# Patient Record
Sex: FEMALE | Race: WHITE | NOT HISPANIC OR LATINO
[De-identification: names, ages, dates, MRNs, and addresses within clinical notes are randomized per-mention and may not be internally consistent; named-entity substitution may affect disease eponyms.]

---

## 2017-01-11 ENCOUNTER — RX ONLY (OUTPATIENT)
Age: 82
Setting detail: RX ONLY
End: 2017-01-11

## 2017-01-25 DIAGNOSIS — D47.3 ET (ESSENTIAL THROMBOCYTHEMIA) (HCC): ICD-10-CM

## 2017-01-25 PROBLEM — D49.2 NEOPLASM OF UNSPECIFIED BEHAVIOR OF BONE, SOFT TISSUE, AND SKIN: Status: RESOLVED | Noted: 2017-01-11 | Resolved: 2017-01-25

## 2017-01-25 RX ORDER — HYDROXYUREA 500 MG/1
CAPSULE ORAL
Qty: 180 CAP | Refills: 0 | Status: SHIPPED | OUTPATIENT
Start: 2017-01-25 | End: 2017-05-02 | Stop reason: SDUPTHER

## 2017-01-25 NOTE — TELEPHONE ENCOUNTER
Discussed with patient's daughter Moriah regarding monthly lab orders placed. Daughter stated patient was in the ER in [place] and had labs completed. They have not had labs completed this month just yet but is aware. Refill was sent for Hydrea.

## 2017-02-02 ENCOUNTER — HOSPITAL ENCOUNTER (OUTPATIENT)
Dept: LAB | Facility: MEDICAL CENTER | Age: 82
End: 2017-02-02
Attending: INTERNAL MEDICINE
Payer: MEDICARE

## 2017-02-02 LAB
ALBUMIN SERPL BCP-MCNC: 4.3 G/DL (ref 3.2–4.9)
ALBUMIN/GLOB SERPL: 1.8 G/DL
ALP SERPL-CCNC: 80 U/L (ref 30–99)
ALT SERPL-CCNC: 7 U/L (ref 2–50)
ANION GAP SERPL CALC-SCNC: 5 MMOL/L (ref 0–11.9)
ANISOCYTOSIS BLD QL SMEAR: ABNORMAL
AST SERPL-CCNC: 17 U/L (ref 12–45)
BASOPHILS # BLD AUTO: 0.3 K/UL (ref 0–0.12)
BASOPHILS NFR BLD AUTO: 6.2 % (ref 0–1.8)
BILIRUB SERPL-MCNC: 1 MG/DL (ref 0.1–1.5)
BUN SERPL-MCNC: 20 MG/DL (ref 8–22)
CALCIUM SERPL-MCNC: 9.4 MG/DL (ref 8.5–10.5)
CHLORIDE SERPL-SCNC: 100 MMOL/L (ref 96–112)
CO2 SERPL-SCNC: 29 MMOL/L (ref 20–33)
CREAT SERPL-MCNC: 0.78 MG/DL (ref 0.5–1.4)
EOSINOPHIL # BLD: 0.09 K/UL (ref 0–0.51)
EOSINOPHIL NFR BLD AUTO: 1.8 % (ref 0–6.9)
ERYTHROCYTE [DISTWIDTH] IN BLOOD BY AUTOMATED COUNT: 88 FL (ref 35.9–50)
GLOBULIN SER CALC-MCNC: 2.4 G/DL (ref 1.9–3.5)
GLUCOSE SERPL-MCNC: 81 MG/DL (ref 65–99)
HCT VFR BLD AUTO: 33.1 % (ref 37–47)
HGB BLD-MCNC: 10.8 G/DL (ref 12–16)
LG PLATELETS BLD QL SMEAR: NORMAL
LYMPHOCYTES # BLD: 0.68 K/UL (ref 1–4.8)
LYMPHOCYTES NFR BLD AUTO: 14.1 % (ref 22–41)
MACROCYTES BLD QL SMEAR: ABNORMAL
MANUAL DIFF BLD: NORMAL
MCH RBC QN AUTO: 37.9 PG (ref 27–33)
MCHC RBC AUTO-ENTMCNC: 32.6 G/DL (ref 33.6–35)
MCV RBC AUTO: 116.1 FL (ref 81.4–97.8)
MICROCYTES BLD QL SMEAR: ABNORMAL
MONOCYTES # BLD: 0.25 K/UL (ref 0–0.85)
MONOCYTES NFR BLD AUTO: 5.3 % (ref 0–13.4)
MORPHOLOGY BLD-IMP: NORMAL
NEUTROPHILS # BLD: 3.48 K/UL (ref 2–7.15)
NEUTROPHILS NFR BLD AUTO: 71.7 % (ref 44–72)
NEUTS BAND NFR BLD MANUAL: 0.9 % (ref 0–10)
NRBC # BLD AUTO: 0 K/UL
NRBC BLD-RTO: 0 /100 WBC
OVALOCYTES BLD QL SMEAR: NORMAL
PLATELET # BLD AUTO: 368 K/UL (ref 164–446)
PLATELET BLD QL SMEAR: NORMAL
PMV BLD AUTO: 11.3 FL (ref 9–12.9)
POIKILOCYTOSIS BLD QL SMEAR: NORMAL
POTASSIUM SERPL-SCNC: 4.7 MMOL/L (ref 3.6–5.5)
PROT SERPL-MCNC: 6.7 G/DL (ref 6–8.2)
RBC # BLD AUTO: 2.85 M/UL (ref 4.2–5.4)
RBC BLD AUTO: PRESENT
SODIUM SERPL-SCNC: 134 MMOL/L (ref 135–145)
TEAR DROP CELLS 1632: NORMAL
WBC # BLD AUTO: 4.8 K/UL (ref 4.8–10.8)

## 2017-02-02 PROCEDURE — 80053 COMPREHEN METABOLIC PANEL: CPT

## 2017-02-02 PROCEDURE — 85007 BL SMEAR W/DIFF WBC COUNT: CPT

## 2017-02-02 PROCEDURE — 36415 COLL VENOUS BLD VENIPUNCTURE: CPT

## 2017-02-02 PROCEDURE — 85027 COMPLETE CBC AUTOMATED: CPT

## 2017-03-06 DIAGNOSIS — D69.3 ESSENTIAL THROMBOCYTOPENIA (HCC): ICD-10-CM

## 2017-03-09 ENCOUNTER — HOSPITAL ENCOUNTER (OUTPATIENT)
Dept: LAB | Facility: MEDICAL CENTER | Age: 82
End: 2017-03-09
Attending: INTERNAL MEDICINE
Payer: MEDICARE

## 2017-03-09 LAB
ALBUMIN SERPL BCP-MCNC: 3.8 G/DL (ref 3.2–4.9)
ALBUMIN/GLOB SERPL: 1.5 G/DL
ALP SERPL-CCNC: 67 U/L (ref 30–99)
ALT SERPL-CCNC: 6 U/L (ref 2–50)
ANION GAP SERPL CALC-SCNC: 6 MMOL/L (ref 0–11.9)
ANISOCYTOSIS BLD QL SMEAR: ABNORMAL
AST SERPL-CCNC: 16 U/L (ref 12–45)
BASOPHILS # BLD AUTO: 0.2 K/UL (ref 0–0.12)
BASOPHILS NFR BLD AUTO: 4.4 % (ref 0–1.8)
BILIRUB SERPL-MCNC: 1.1 MG/DL (ref 0.1–1.5)
BUN SERPL-MCNC: 21 MG/DL (ref 8–22)
CALCIUM SERPL-MCNC: 9.2 MG/DL (ref 8.5–10.5)
CHLORIDE SERPL-SCNC: 101 MMOL/L (ref 96–112)
CO2 SERPL-SCNC: 28 MMOL/L (ref 20–33)
CREAT SERPL-MCNC: 0.7 MG/DL (ref 0.5–1.4)
EOSINOPHIL # BLD: 0.24 K/UL (ref 0–0.51)
EOSINOPHIL NFR BLD AUTO: 5.3 % (ref 0–6.9)
ERYTHROCYTE [DISTWIDTH] IN BLOOD BY AUTOMATED COUNT: 72.2 FL (ref 35.9–50)
GLOBULIN SER CALC-MCNC: 2.5 G/DL (ref 1.9–3.5)
GLUCOSE SERPL-MCNC: 127 MG/DL (ref 65–99)
HCT VFR BLD AUTO: 30.5 % (ref 37–47)
HGB BLD-MCNC: 10.1 G/DL (ref 12–16)
LYMPHOCYTES # BLD: 0.85 K/UL (ref 1–4.8)
LYMPHOCYTES NFR BLD AUTO: 18.4 % (ref 22–41)
MACROCYTES BLD QL SMEAR: ABNORMAL
MANUAL DIFF BLD: NORMAL
MCH RBC QN AUTO: 38.5 PG (ref 27–33)
MCHC RBC AUTO-ENTMCNC: 33.1 G/DL (ref 33.6–35)
MCV RBC AUTO: 116.4 FL (ref 81.4–97.8)
MONOCYTES # BLD: 0.2 K/UL (ref 0–0.85)
MONOCYTES NFR BLD AUTO: 4.4 % (ref 0–13.4)
MORPHOLOGY BLD-IMP: NORMAL
NEUTROPHILS # BLD: 3.11 K/UL (ref 2–7.15)
NEUTROPHILS NFR BLD AUTO: 67.5 % (ref 44–72)
NRBC # BLD AUTO: 0.02 K/UL
NRBC BLD-RTO: 0.4 /100 WBC
PLATELET # BLD AUTO: 246 K/UL (ref 164–446)
PLATELET BLD QL SMEAR: NORMAL
PMV BLD AUTO: 11 FL (ref 9–12.9)
POIKILOCYTOSIS BLD QL SMEAR: NORMAL
POLYCHROMASIA BLD QL SMEAR: NORMAL
POTASSIUM SERPL-SCNC: 4.5 MMOL/L (ref 3.6–5.5)
PROT SERPL-MCNC: 6.3 G/DL (ref 6–8.2)
RBC # BLD AUTO: 2.62 M/UL (ref 4.2–5.4)
RBC BLD AUTO: PRESENT
SCHISTOCYTES BLD QL SMEAR: NORMAL
SODIUM SERPL-SCNC: 135 MMOL/L (ref 135–145)
WBC # BLD AUTO: 4.6 K/UL (ref 4.8–10.8)

## 2017-03-09 PROCEDURE — 85027 COMPLETE CBC AUTOMATED: CPT

## 2017-03-09 PROCEDURE — 80053 COMPREHEN METABOLIC PANEL: CPT

## 2017-03-09 PROCEDURE — 85007 BL SMEAR W/DIFF WBC COUNT: CPT

## 2017-03-09 PROCEDURE — 36415 COLL VENOUS BLD VENIPUNCTURE: CPT

## 2017-05-01 ENCOUNTER — HOSPITAL ENCOUNTER (OUTPATIENT)
Dept: LAB | Facility: MEDICAL CENTER | Age: 82
End: 2017-05-01
Attending: INTERNAL MEDICINE
Payer: MEDICARE

## 2017-05-01 LAB
ALBUMIN SERPL BCP-MCNC: 4.2 G/DL (ref 3.2–4.9)
ALBUMIN/GLOB SERPL: 1.4 G/DL
ALP SERPL-CCNC: 71 U/L (ref 30–99)
ALT SERPL-CCNC: 6 U/L (ref 2–50)
ANION GAP SERPL CALC-SCNC: 9 MMOL/L (ref 0–11.9)
ANISOCYTOSIS BLD QL SMEAR: ABNORMAL
AST SERPL-CCNC: 19 U/L (ref 12–45)
BASOPHILS # BLD AUTO: 5.3 % (ref 0–1.8)
BASOPHILS # BLD: 0.29 K/UL (ref 0–0.12)
BILIRUB SERPL-MCNC: 1.2 MG/DL (ref 0.1–1.5)
BUN SERPL-MCNC: 22 MG/DL (ref 8–22)
CALCIUM SERPL-MCNC: 9.3 MG/DL (ref 8.5–10.5)
CHLORIDE SERPL-SCNC: 96 MMOL/L (ref 96–112)
CO2 SERPL-SCNC: 28 MMOL/L (ref 20–33)
CREAT SERPL-MCNC: 0.76 MG/DL (ref 0.5–1.4)
EOSINOPHIL # BLD AUTO: 0.1 K/UL (ref 0–0.51)
EOSINOPHIL NFR BLD: 1.8 % (ref 0–6.9)
ERYTHROCYTE [DISTWIDTH] IN BLOOD BY AUTOMATED COUNT: 71.2 FL (ref 35.9–50)
GFR SERPL CREATININE-BSD FRML MDRD: >60 ML/MIN/1.73 M 2
GLOBULIN SER CALC-MCNC: 2.9 G/DL (ref 1.9–3.5)
GLUCOSE SERPL-MCNC: 95 MG/DL (ref 65–99)
HCT VFR BLD AUTO: 33.2 % (ref 37–47)
HGB BLD-MCNC: 11 G/DL (ref 12–16)
LYMPHOCYTES # BLD AUTO: 0.82 K/UL (ref 1–4.8)
LYMPHOCYTES NFR BLD: 15.2 % (ref 22–41)
MACROCYTES BLD QL SMEAR: ABNORMAL
MANUAL DIFF BLD: NORMAL
MCH RBC QN AUTO: 38.1 PG (ref 27–33)
MCHC RBC AUTO-ENTMCNC: 33.1 G/DL (ref 33.6–35)
MCV RBC AUTO: 114.9 FL (ref 81.4–97.8)
MONOCYTES # BLD AUTO: 0.29 K/UL (ref 0–0.85)
MONOCYTES NFR BLD AUTO: 5.4 % (ref 0–13.4)
MORPHOLOGY BLD-IMP: NORMAL
NEUTROPHILS # BLD AUTO: 3.76 K/UL (ref 2–7.15)
NEUTROPHILS NFR BLD: 69.6 % (ref 44–72)
NRBC # BLD AUTO: 0 K/UL
NRBC BLD AUTO-RTO: 0 /100 WBC
OVALOCYTES BLD QL SMEAR: NORMAL
PLATELET # BLD AUTO: 354 K/UL (ref 164–446)
PLATELET BLD QL SMEAR: NORMAL
PMV BLD AUTO: 11.4 FL (ref 9–12.9)
POIKILOCYTOSIS BLD QL SMEAR: NORMAL
POTASSIUM SERPL-SCNC: 4.7 MMOL/L (ref 3.6–5.5)
PROMYELOCYTES NFR BLD MANUAL: 2.7 %
PROT SERPL-MCNC: 7.1 G/DL (ref 6–8.2)
RBC # BLD AUTO: 2.89 M/UL (ref 4.2–5.4)
RBC BLD AUTO: PRESENT
SCHISTOCYTES BLD QL SMEAR: NORMAL
SODIUM SERPL-SCNC: 133 MMOL/L (ref 135–145)
WBC # BLD AUTO: 5.4 K/UL (ref 4.8–10.8)

## 2017-05-01 PROCEDURE — 85027 COMPLETE CBC AUTOMATED: CPT

## 2017-05-01 PROCEDURE — 85007 BL SMEAR W/DIFF WBC COUNT: CPT

## 2017-05-01 PROCEDURE — 36415 COLL VENOUS BLD VENIPUNCTURE: CPT

## 2017-05-01 PROCEDURE — 80053 COMPREHEN METABOLIC PANEL: CPT

## 2017-05-02 ENCOUNTER — APPOINTMENT (OUTPATIENT)
Dept: HEMATOLOGY ONCOLOGY | Facility: MEDICAL CENTER | Age: 82
End: 2017-05-02
Payer: MEDICARE

## 2017-05-02 ENCOUNTER — OFFICE VISIT (OUTPATIENT)
Dept: HEMATOLOGY ONCOLOGY | Facility: MEDICAL CENTER | Age: 82
End: 2017-05-02
Payer: MEDICARE

## 2017-05-02 VITALS
SYSTOLIC BLOOD PRESSURE: 112 MMHG | DIASTOLIC BLOOD PRESSURE: 58 MMHG | RESPIRATION RATE: 16 BRPM | TEMPERATURE: 98.4 F | HEART RATE: 73 BPM | HEIGHT: 62 IN | WEIGHT: 126.98 LBS | BODY MASS INDEX: 23.37 KG/M2 | OXYGEN SATURATION: 94 %

## 2017-05-02 DIAGNOSIS — D47.3 ET (ESSENTIAL THROMBOCYTHEMIA) (HCC): ICD-10-CM

## 2017-05-02 PROCEDURE — G8420 CALC BMI NORM PARAMETERS: HCPCS | Performed by: INTERNAL MEDICINE

## 2017-05-02 PROCEDURE — G8432 DEP SCR NOT DOC, RNG: HCPCS | Performed by: INTERNAL MEDICINE

## 2017-05-02 PROCEDURE — 1101F PT FALLS ASSESS-DOCD LE1/YR: CPT | Mod: 8P | Performed by: INTERNAL MEDICINE

## 2017-05-02 PROCEDURE — 99213 OFFICE O/P EST LOW 20 MIN: CPT | Performed by: INTERNAL MEDICINE

## 2017-05-02 PROCEDURE — 4040F PNEUMOC VAC/ADMIN/RCVD: CPT | Performed by: INTERNAL MEDICINE

## 2017-05-02 PROCEDURE — 1036F TOBACCO NON-USER: CPT | Performed by: INTERNAL MEDICINE

## 2017-05-02 RX ORDER — HYDROXYUREA 500 MG/1
CAPSULE ORAL
Qty: 180 CAP | Refills: 0 | Status: SHIPPED | OUTPATIENT
Start: 2017-05-02

## 2017-05-02 ASSESSMENT — PAIN SCALES - GENERAL: PAINLEVEL: 6=MODERATE PAIN

## 2017-05-02 NOTE — Clinical Note
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       History of presenting illness:  Ms. Wilson is 89-year-old female diagnosed in 2013 with myeloproliferative disease.  She has history of hereditary deafness and has cochlear implant. She has history of TIA and was initially on Aggrenox but was taken off it in the past secondary high risk of fall and development of hematomas in 7/2012.  7/2012 she had another episode of TIA.  She was started on  mg qday.  2013 she established care secondary to abnormal blood work showing elevated platelet count. She also had some elevation of hemoglobin as well initally.  She was diagnosed with myeloproliferative disease most likely ET and Polycythemia. 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  She was started on Hydroxyura.  She had good response to treatment. Her dosage has been adjusted based on her counts. More recently she has been on hydroxyurea 500 mg BID.           Interval History:    She is here for follow up visit he is accompanied by her daughter who was present in the room.  She has been feeling well since her last visit.  She has now living with her daughter.  She has non healing source in her right lower extremity which is followed closely by primary care physician. Her knee pain has been fairly stable. She continues to have mild the bilateral intermittent edema. To have intermittent dizziness but denies any major falls since her last visit. She denies any recent hospitalizations. She has an appetite but has had some weight loss since her last visit. She denies any fevers, chills or night  sweats. She denies any cough or shortness of breath. She is continued on oxygen.            Past Medical History:  Polycythema Vera  Hereditary bilateral deafness  TIA  HTN  COPD  Restless leg syndrome  Chronic diarrhea - a couple of years ago, now resovled  Urinary incontinence    Depression  Migraine headaches    Diastolic CHF    Anxiety  neuropathic pain  Sciatica  Seasonal allergies  Postmenopausal      Allergies:  Demerol      Medications:  Current Outpatient Prescriptions on File Prior to Visit    Medication  Sig  Dispense  Refill    •  metoprolol (LOPRESSOR) 25 MG Tab  Take 25 mg by mouth 2 times a day.        •  vitamin e (VITAMIN E) 400 UNIT CAPS  Take 400 Units by mouth every day.        •  vitamin A 09124 UNIT capsule  Take 8,000 Units by mouth every day.        •  vitamin D (CHOLECALCIFEROL) 1000 UNIT TABS  Take 1,000 Units by mouth every day.        •  ascorbic acid (ASCORBIC ACID) 500 MG TABS  Take 500 mg by mouth every day.        •  estradiol (ESTRACE) 2 MG TABS  Take 1 Tab by mouth every day.  90 Tab  3    •  sertraline (ZOLOFT) 50 MG TABS  Take 1 Tab by mouth every day. (Patient taking differently: Take 50 mg by mouth 2 Times a Day.)  30 Tab  11    •  clonazepam (KLONOPIN) 1 MG TABS  Take 1 Tab by mouth every day. AT BEDTIME AS NEEDED FOR SLEEP  30 Each  5    •  acetaminophen-codeine #3 (TYLENOL #3) 300-30 MG TABS  Take 1-2 Tabs by mouth every four hours as needed. Maximum 12 tabs in 24 hours  90 Tab  3    •  torsemide (DEMADEX) 20 MG TABS  Take 20 mg by mouth every day.        •  oxybutynin (DITROPAN XL) 10 MG CR tablet  Take 1 Tab by mouth every day.  90 Tab  3    •  aspirin (ASA) 325 MG TABS  Take 325 mg by mouth every 6 hours as needed.          •  esomeprazole (NEXIUM) 40 MG capsule  Take 40 mg by mouth every morning before breakfast.        •  clotrimazole-betamethasone (LOTRISONE) 1-0.05 % Cream  Apply 1 Application to affected area(s) 2 times a day.  1 Tube  0    •  cholestyramine  (QUESTRAN) 4 G packet  TAKE 1 PACKET(4 GRAMS) BY MOUTH AS DIRECTED ON PACKAGE TWO Times A Day  60 Each  3    •  lisinopril (PRINIVIL) 10 MG TABS  Take 10 mg by mouth every day.        •  cyanocobalamin (VITAMIN B-12) 500 MCG TABS  Take 500 mcg by mouth every day.        •  therapeutic multivitamin-minerals (THERAGRAN-M) TABS  Take 1 Tab by mouth every day.        •  prochlorperazine (COMPAZINE) 10 MG TABS  Take 1 Tab by mouth every 6 hours as needed.  90 Tab  0    •  diltiazem (CARDIZEM) 120 MG TABS  Take 120 mg by mouth every day.        •  oxybutynin (DITROPAN) 5 MG TABS  TAKE TWO TABLETS BY MOUTH IN THE MORNING  180 Each  1    •  rizatriptan (MAXALT) 10 MG tablet  Take 1 Tab by mouth Once PRN for Migraine for 1 dose. May repeat in 2 hours if needed  10 Tab  3    •  albuterol (VENTOLIN OR PROVENTIL) 108 (90 BASE) MCG/ACT AERS  Inhale 2 Puffs by mouth every 6 hours as needed.  1 Inhaler  3    •  conjugated estrogen (PREMARIN) 0.625 MG TABS  Take 0.625 mg by mouth every day. DAYS 1-25               No current facility-administered medications on file prior to visit.              Review of Systems:  All other review of systems are negative except what was mentioned above in the HPI.  Constitutional: Negative for fever and chills.  Positive for malaise/fatigue.    HENT: Negative for ear pain and nosebleeds.    Eyes: Negative for recent vision changes    Respiratory: Negative for cough, sputum production and shortness of breath on exertion.     Cardiovascular: Positive for leg swelling.    Gastrointestinal:  No nausea, vomiting, diarrhea or constipation. Positive for intermittent abdominal pain  Genitourinary: Urinary Incontinence    Musculoskeletal: Chronic back pain and right knee pain.  Skin: Negative for rash.   Neurological: Positive for intermittent dizziness.  Positive for diffuse weakness  Endo/Heme/Allergies: no excessive bleeding.    Psychiatric/Behavioral: Negative for depression.        Physical  "Exam:  Vitals:     Vitals   Filed Vitals:      05/02/17 1339    BP:  112/58    Pulse:  73    Temp:  36.9 °C (98.4 °F)    Resp:  16    Height:  1.575 m (5' 2.01\")    Weight:  57.6 kg (126 lb 15.8 oz)    SpO2:  94%             General: Very thin, Not in acute distress, alert and oriented x 3,    HEENT: normalcephalic, atraumatic, extra ocular muscles intact, and moist oral mucus membranes.  Neck: Supple neck and full range of motion  Lymph nodes: No palpable cervical and supraclavicular lymphadenopathy.     CVS: regular rate and rhythm  RESP: Clear breath sound bilaterally   ABD: Soft, no palpable organomegaly. Non tender.  EXT: Positive for bilateral leg swelling. Small non healing lesions in right lower leg   CNS: Alert and oriented x3 and muscle strength diffuse weakness.       Labs:   No visits with results within 1 Day(s) from this visit.  Latest known visit with results is:      Hospital Outpatient Visit on 05/01/2017    Component  Date  Value  Ref Range  Status    •  Sodium  05/01/2017  133*  135 - 145 mmol/L  Final    •  Potassium  05/01/2017  4.7   3.6 - 5.5 mmol/L  Final    •  Chloride  05/01/2017  96   96 - 112 mmol/L  Final    •  Co2  05/01/2017  28   20 - 33 mmol/L  Final    •  Anion Gap  05/01/2017  9.0   0.0 - 11.9  Final    •  Glucose  05/01/2017  95   65 - 99 mg/dL  Final    •  Bun  05/01/2017  22   8 - 22 mg/dL  Final    •  Creatinine  05/01/2017  0.76   0.50 - 1.40 mg/dL  Final    •  Calcium  05/01/2017  9.3   8.5 - 10.5 mg/dL  Final    •  AST(SGOT)  05/01/2017  19   12 - 45 U/L  Final    •  ALT(SGPT)  05/01/2017  6   2 - 50 U/L  Final    •  Alkaline Phosphatase  05/01/2017  71   30 - 99 U/L  Final    •  Total Bilirubin  05/01/2017  1.2   0.1 - 1.5 mg/dL  Final    •  Albumin  05/01/2017  4.2   3.2 - 4.9 g/dL  Final    •  Total Protein  05/01/2017  7.1   6.0 - 8.2 g/dL  Final    •  Globulin  05/01/2017  2.9   1.9 - 3.5 g/dL  Final    •  A-G Ratio  05/01/2017  1.4     Final    •  WBC  05/01/2017  5.4  "  4.8 - 10.8 K/uL  Final    •  RBC  05/01/2017  2.89*  4.20 - 5.40 M/uL  Final    •  Hemoglobin  05/01/2017  11.0*  12.0 - 16.0 g/dL  Final    •  Hematocrit  05/01/2017  33.2*  37.0 - 47.0 %  Final    •  MCV  05/01/2017  114.9*  81.4 - 97.8 fL  Final    •  MCH  05/01/2017  38.1*  27.0 - 33.0 pg  Final    •  MCHC  05/01/2017  33.1*  33.6 - 35.0 g/dL  Final    •  RDW  05/01/2017  71.2*  35.9 - 50.0 fL  Final    •  Platelet Count  05/01/2017  354   164 - 446 K/uL  Final    •  MPV  05/01/2017  11.4   9.0 - 12.9 fL  Final    •  Nucleated RBC  05/01/2017  0.00     Final    •  NRBC (Absolute)  05/01/2017  0.00     Final    •  Neutrophils-Polys  05/01/2017  69.60   44.00 - 72.00 %  Final    •  Lymphocytes  05/01/2017  15.20*  22.00 - 41.00 %  Final    •  Monocytes  05/01/2017  5.40   0.00 - 13.40 %  Final    •  Eosinophils  05/01/2017  1.80   0.00 - 6.90 %  Final    •  Basophils  05/01/2017  5.30*  0.00 - 1.80 %  Final    •  Neutrophils (Absolute)  05/01/2017  3.76   2.00 - 7.15 K/uL  Final      Includes immature neutrophils, if present.    •  Lymphs (Absolute)  05/01/2017  0.82*  1.00 - 4.80 K/uL  Final    •  Monos (Absolute)  05/01/2017  0.29   0.00 - 0.85 K/uL  Final    •  Eos (Absolute)  05/01/2017  0.10   0.00 - 0.51 K/uL  Final    •  Baso (Absolute)  05/01/2017  0.29*  0.00 - 0.12 K/uL  Final    •  Anisocytosis  05/01/2017  2+     Final    •  Macrocytosis  05/01/2017  2+     Final    •  Progranulocytes  05/01/2017  2.70     Final    •  Manual Diff Status  05/01/2017  PERFORMED     Final    •  Peripheral Smear Review  05/01/2017  see below     Final      Comment: Due to instrument suspect flags, further review of peripheral smear is   indicated on this patient sample. This review may or may not result in  abnormal findings.      •  Plt Estimation  05/01/2017  Normal     Final    •  RBC Morphology  05/01/2017  Present     Final    •  Poikilocytosis  05/01/2017  2+     Final    •  Ovalocytes  05/01/2017  1+     Final    •   Schistocytes  05/01/2017  1+     Final    •  GFR If   05/01/2017  >60   >60 mL/min/1.73 m 2  Final    •  GFR If Non   05/01/2017  >60   >60 mL/min/1.73 m 2  Final      ]       Assessment and Plan:      Myeloproliferative disease with ET: She was diagnosed secondary to elevated platelet count as well as hemoglobin. AFUA 2 positive. She has been on 1000 mg of Hydrea and has been tolerating it clinically. Labs reviewed and stable.    She is followed closely by PCP.  Recommend to continue Hydrea 500 mg BID.  She is to get labs every 3 months and to follow up again in 6 months.        I informed the patient that I will be leaving the renal failure shortly. I went over his options for transition of care. After discussion, she will be scheduled to see Dr. Casillas.  She would like to be transitioned to [facility] is established.      She and her daughter agreed and verbalized their agreement and understanding with the current plan.  I answered all questions and concerns she has at this time and advised her to call at any time in the interim with questions or concerns in regards to her care.   Thank you for allowing me to participate in her care.      Please note that this dictation was created using voice recognition software. I have made every reasonable attempt to correct obvious errors, but I expect that there are errors of grammar and possibly content that I did not discover before finalizing the note.      Sincerely,  Lori Egan  [address], [address]   [phone]

## 2017-05-02 NOTE — PROGRESS NOTES
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       History of presenting illness:  Ms. Wilson is 89-year-old female diagnosed in 2013 with myeloproliferative disease.  She has history of hereditary deafness and has cochlear implant. She has history of TIA and was initially on Aggrenox but was taken off it in the past secondary high risk of fall and development of hematomas in 7/2012.  7/2012 she had another episode of TIA.  She was started on  mg qday.  2013 she established care secondary to abnormal blood work showing elevated platelet count. She also had some elevation of hemoglobin as well initally.  She was diagnosed with myeloproliferative disease most likely ET and Polycythemia. 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  She was started on Hydroxyura.  She had good response to treatment. Her dosage has been adjusted based on her counts. More recently she has been on hydroxyurea 500 mg BID.        Interval History:   She is here for follow up visit he is accompanied by her daughter who was present in the room.  She has been feeling well since her last visit.  She has now living with her daughter.  She has non healing source in her right lower extremity which is followed closely by primary care physician. Her knee pain has been fairly stable. She continues to have mild the bilateral intermittent edema. To have intermittent dizziness but denies any major falls since her last visit. She denies any recent hospitalizations. She has an appetite but has had some weight loss since her last visit. She denies any fevers, chills or night sweats. She denies any cough or shortness of breath. She is continued on oxygen.        Past Medical History:  1. Polycythema Vera  2. Hereditary bilateral  deafness  3. TIA  4. HTN  5. COPD  6. Restless leg syndrome  7. Chronic diarrhea - a couple of years ago, now resovled  8. Urinary incontinence    9. Depression  10. Migraine headaches    11. Diastolic CHF    12. Anxiety  13. neuropathic pain  14. Sciatica  15. Seasonal allergies  16. Postmenopausal    Allergies:  Demerol    Medications:  Current Outpatient Prescriptions on File Prior to Visit   Medication Sig Dispense Refill   • metoprolol (LOPRESSOR) 25 MG Tab Take 25 mg by mouth 2 times a day.     • vitamin e (VITAMIN E) 400 UNIT CAPS Take 400 Units by mouth every day.     • vitamin A 44296 UNIT capsule Take 8,000 Units by mouth every day.     • vitamin D (CHOLECALCIFEROL) 1000 UNIT TABS Take 1,000 Units by mouth every day.     • ascorbic acid (ASCORBIC ACID) 500 MG TABS Take 500 mg by mouth every day.     • estradiol (ESTRACE) 2 MG TABS Take 1 Tab by mouth every day. 90 Tab 3   • sertraline (ZOLOFT) 50 MG TABS Take 1 Tab by mouth every day. (Patient taking differently: Take 50 mg by mouth 2 Times a Day.) 30 Tab 11   • clonazepam (KLONOPIN) 1 MG TABS Take 1 Tab by mouth every day. AT BEDTIME AS NEEDED FOR SLEEP 30 Each 5   • acetaminophen-codeine #3 (TYLENOL #3) 300-30 MG TABS Take 1-2 Tabs by mouth every four hours as needed. Maximum 12 tabs in 24 hours 90 Tab 3   • torsemide (DEMADEX) 20 MG TABS Take 20 mg by mouth every day.     • oxybutynin (DITROPAN XL) 10 MG CR tablet Take 1 Tab by mouth every day. 90 Tab 3   • aspirin (ASA) 325 MG TABS Take 325 mg by mouth every 6 hours as needed.       • esomeprazole (NEXIUM) 40 MG capsule Take 40 mg by mouth every morning before breakfast.     • clotrimazole-betamethasone (LOTRISONE) 1-0.05 % Cream Apply 1 Application to affected area(s) 2 times a day. 1 Tube 0   • cholestyramine (QUESTRAN) 4 G packet TAKE 1 PACKET(4 GRAMS) BY MOUTH AS DIRECTED ON PACKAGE TWO Times A Day 60 Each 3   • lisinopril (PRINIVIL) 10 MG TABS Take 10 mg by mouth every day.     • cyanocobalamin  "(VITAMIN B-12) 500 MCG TABS Take 500 mcg by mouth every day.     • therapeutic multivitamin-minerals (THERAGRAN-M) TABS Take 1 Tab by mouth every day.     • prochlorperazine (COMPAZINE) 10 MG TABS Take 1 Tab by mouth every 6 hours as needed. 90 Tab 0   • diltiazem (CARDIZEM) 120 MG TABS Take 120 mg by mouth every day.     • oxybutynin (DITROPAN) 5 MG TABS TAKE TWO TABLETS BY MOUTH IN THE MORNING 180 Each 1   • rizatriptan (MAXALT) 10 MG tablet Take 1 Tab by mouth Once PRN for Migraine for 1 dose. May repeat in 2 hours if needed 10 Tab 3   • albuterol (VENTOLIN OR PROVENTIL) 108 (90 BASE) MCG/ACT AERS Inhale 2 Puffs by mouth every 6 hours as needed. 1 Inhaler 3   • conjugated estrogen (PREMARIN) 0.625 MG TABS Take 0.625 mg by mouth every day. DAYS 1-25        No current facility-administered medications on file prior to visit.         Review of Systems:  All other review of systems are negative except what was mentioned above in the HPI.  Constitutional: Negative for fever and chills.  Positive for malaise/fatigue.    HENT: Negative for ear pain and nosebleeds.    Eyes: Negative for recent vision changes    Respiratory: Negative for cough, sputum production and shortness of breath on exertion.     Cardiovascular: Positive for leg swelling.    Gastrointestinal:  No nausea, vomiting, diarrhea or constipation. Positive for intermittent abdominal pain  Genitourinary: Urinary Incontinence    Musculoskeletal: Chronic back pain and right knee pain.  Skin: Negative for rash.   Neurological: Positive for intermittent dizziness.  Positive for diffuse weakness  Endo/Heme/Allergies: no excessive bleeding.    Psychiatric/Behavioral: Negative for depression.      Physical Exam:  Vitals:    Filed Vitals:    05/02/17 1339   BP: 112/58   Pulse: 73   Temp: 36.9 °C (98.4 °F)   Resp: 16   Height: 1.575 m (5' 2.01\")   Weight: 57.6 kg (126 lb 15.8 oz)   SpO2: 94%       General: Very thin, Not in acute distress, alert and oriented x 3,  "   HEENT: normalcephalic, atraumatic, extra ocular muscles intact, and moist oral mucus membranes.  Neck: Supple neck and full range of motion  Lymph nodes: No palpable cervical and supraclavicular lymphadenopathy.     CVS: regular rate and rhythm  RESP: Clear breath sound bilaterally   ABD: Soft, no palpable organomegaly. Non tender.  EXT: Positive for bilateral leg swelling. Small non healing lesions in right lower leg   CNS: Alert and oriented x3 and muscle strength diffuse weakness.     Labs:   No visits with results within 1 Day(s) from this visit.  Latest known visit with results is:    Hospital Outpatient Visit on 05/01/2017   Component Date Value Ref Range Status   • Sodium 05/01/2017 133* 135 - 145 mmol/L Final   • Potassium 05/01/2017 4.7  3.6 - 5.5 mmol/L Final   • Chloride 05/01/2017 96  96 - 112 mmol/L Final   • Co2 05/01/2017 28  20 - 33 mmol/L Final   • Anion Gap 05/01/2017 9.0  0.0 - 11.9 Final   • Glucose 05/01/2017 95  65 - 99 mg/dL Final   • Bun 05/01/2017 22  8 - 22 mg/dL Final   • Creatinine 05/01/2017 0.76  0.50 - 1.40 mg/dL Final   • Calcium 05/01/2017 9.3  8.5 - 10.5 mg/dL Final   • AST(SGOT) 05/01/2017 19  12 - 45 U/L Final   • ALT(SGPT) 05/01/2017 6  2 - 50 U/L Final   • Alkaline Phosphatase 05/01/2017 71  30 - 99 U/L Final   • Total Bilirubin 05/01/2017 1.2  0.1 - 1.5 mg/dL Final   • Albumin 05/01/2017 4.2  3.2 - 4.9 g/dL Final   • Total Protein 05/01/2017 7.1  6.0 - 8.2 g/dL Final   • Globulin 05/01/2017 2.9  1.9 - 3.5 g/dL Final   • A-G Ratio 05/01/2017 1.4   Final   • WBC 05/01/2017 5.4  4.8 - 10.8 K/uL Final   • RBC 05/01/2017 2.89* 4.20 - 5.40 M/uL Final   • Hemoglobin 05/01/2017 11.0* 12.0 - 16.0 g/dL Final   • Hematocrit 05/01/2017 33.2* 37.0 - 47.0 % Final   • MCV 05/01/2017 114.9* 81.4 - 97.8 fL Final   • MCH 05/01/2017 38.1* 27.0 - 33.0 pg Final   • MCHC 05/01/2017 33.1* 33.6 - 35.0 g/dL Final   • RDW 05/01/2017 71.2* 35.9 - 50.0 fL Final   • Platelet Count 05/01/2017 354  164 -  446 K/uL Final   • MPV 05/01/2017 11.4  9.0 - 12.9 fL Final   • Nucleated RBC 05/01/2017 0.00   Final   • NRBC (Absolute) 05/01/2017 0.00   Final   • Neutrophils-Polys 05/01/2017 69.60  44.00 - 72.00 % Final   • Lymphocytes 05/01/2017 15.20* 22.00 - 41.00 % Final   • Monocytes 05/01/2017 5.40  0.00 - 13.40 % Final   • Eosinophils 05/01/2017 1.80  0.00 - 6.90 % Final   • Basophils 05/01/2017 5.30* 0.00 - 1.80 % Final   • Neutrophils (Absolute) 05/01/2017 3.76  2.00 - 7.15 K/uL Final    Includes immature neutrophils, if present.   • Lymphs (Absolute) 05/01/2017 0.82* 1.00 - 4.80 K/uL Final   • Monos (Absolute) 05/01/2017 0.29  0.00 - 0.85 K/uL Final   • Eos (Absolute) 05/01/2017 0.10  0.00 - 0.51 K/uL Final   • Baso (Absolute) 05/01/2017 0.29* 0.00 - 0.12 K/uL Final   • Anisocytosis 05/01/2017 2+   Final   • Macrocytosis 05/01/2017 2+   Final   • Progranulocytes 05/01/2017 2.70   Final   • Manual Diff Status 05/01/2017 PERFORMED   Final   • Peripheral Smear Review 05/01/2017 see below   Final    Comment: Due to instrument suspect flags, further review of peripheral smear is  indicated on this patient sample. This review may or may not result in  abnormal findings.     • Plt Estimation 05/01/2017 Normal   Final   • RBC Morphology 05/01/2017 Present   Final   • Poikilocytosis 05/01/2017 2+   Final   • Ovalocytes 05/01/2017 1+   Final   • Schistocytes 05/01/2017 1+   Final   • GFR If  05/01/2017 >60  >60 mL/min/1.73 m 2 Final   • GFR If Non  05/01/2017 >60  >60 mL/min/1.73 m 2 Final   ]     Assessment and Plan:    1. Myeloproliferative disease with ET: She was diagnosed secondary to elevated platelet count as well as hemoglobin. AFUA 2 positive. She has been on 1000 mg of Hydrea and has been tolerating it clinically. Labs reviewed and stable.    2. She is followed closely by PCP.  3. Recommend to continue Hydrea 500 mg BID.  She is to get labs every 3 months and to follow up again in 6  months.      I informed the patient that I will be leaving the renal failure shortly. I went over his options for transition of care. After discussion, she will be scheduled to see Dr. Casillas.  She would like to be transitioned to [facility] is established.    She and her daughter agreed and verbalized their agreement and understanding with the current plan.  I answered all questions and concerns she has at this time and advised her to call at any time in the interim with questions or concerns in regards to her care.   Thank you for allowing me to participate in her care.    Please note that this dictation was created using voice recognition software. I have made every reasonable attempt to correct obvious errors, but I expect that there are errors of grammar and possibly content that I did not discover before finalizing the note.

## 2017-05-02 NOTE — MR AVS SNAPSHOT
"        Deyanirachon Wilson   2017 1:00 PM   Office Visit   MRN: [id number]    Department:  [facility]   Dept Phone:  [phone]    Description:  Female : 10/17/1923   Provider:  Lori Egan M.D.           Reason for Visit     Other 6mon      Allergies as of 2017     Allergen Noted Reactions    Demerol 10/24/2011         You were diagnosed with     ET (essential thrombocythemia) (CMS-HCC)   [007366]         Vital Signs     Blood Pressure Pulse Temperature Respirations Height Weight    112/58 mmHg 73 36.9 °C (98.4 °F) 16 1.575 m (5' 2.01\") 57.6 kg (126 lb 15.8 oz)    Body Mass Index Oxygen Saturation Smoking Status             23.22 kg/m2 94% Never Smoker          Basic Information     Date Of Birth Sex Race Ethnicity Preferred Language    10/17/1923 Female White Non- English      Your appointments     Oct 24, 2017  1:00 PM   ONCOLOGY NEW PATIENT 60 MIN with Fazal Casillas M.D.   [facility] (--)    [address] [place] [address], [address]  [facility] [id number] [phone]              Problem List              ICD-10-CM Priority Class Noted - Resolved    Anxiety F41.9   10/25/2011 - Present    Restless leg syndrome G25.81   10/25/2011 - Present    HTN (hypertension) I10   10/25/2011 - Present    Neuropathic pain M79.2   10/25/2011 - Present    URINARY INCONTINENCE    10/25/2011 - Present    Depression F32.9   10/25/2011 - Present    Postmenopausal Z78.0   10/25/2011 - Present    Chronic diarrhea K52.9   10/25/2011 - Present    Weight loss, abnormal R63.4   10/25/2011 - Present    Seasonal allergies J30.2   10/25/2011 - Present    Migraines G43.909 Low Chronic 2013 - Present    Pneumonia due to Klebsiella pneumoniae (CMS-HCC) J15.0 High Acute 2013 - Present    Right shoulder pain M25.511 Medium Chronic 10/8/2013 - Present    CHF (congestive heart failure) (CMS-HCC) I50.9   2014 - Present    Acute systolic heart failure (CMS-HCC) I50.21   2014 - Present    Chest pain " syndrome R07.9   1/31/2014 - Present    Sensorineural hearing loss of both ears H90.3   8/13/2014 - Present    Cochlear implant in place with multiple channels Z96.21   8/13/2014 - Present      Health Maintenance        Date Due Completion Dates    IMM DTaP/Tdap/Td Vaccine (1 - Tdap) 10/17/1942 ---    PAP SMEAR 10/17/1944 ---    MAMMOGRAM 10/17/1963 ---    COLONOSCOPY 10/17/1973 ---    IMM ZOSTER VACCINE 10/17/1983 ---    BONE DENSITY 10/17/1988 ---    IMM PNEUMOCOCCAL 65+ (ADULT) HIGH/HIGHEST RISK SERIES (2 of 2 - PCV13) 2/1/2015 2/1/2014            Current Immunizations     Influenza TIV (IM) 9/1/2013, 10/11/2012, 10/25/2011    Pneumococcal polysaccharide vaccine (PPSV-23) 2/1/2014 11:32 AM      Below and/or attached are the medications your provider expects you to take. Review all of your home medications and newly ordered medications with your provider and/or pharmacist. Follow medication instructions as directed by your provider and/or pharmacist. Please keep your medication list with you and share with your provider. Update the information when medications are discontinued, doses are changed, or new medications (including over-the-counter products) are added; and carry medication information at all times in the event of emergency situations     Allergies:  DEMEROL - (reactions not documented)               Medications  Valid as of: May 02, 2017 -  2:11 PM    Generic Name Brand Name Tablet Size Instructions for use    Acetaminophen-Codeine (Tab) TYLENOL #3 300-30 MG Take 1-2 Tabs by mouth every four hours as needed. Maximum 12 tabs in 24 hours        Albuterol Sulfate (Aero Soln) albuterol 108 (90 BASE) MCG/ACT Inhale 2 Puffs by mouth every 6 hours as needed.        Ascorbic Acid (Tab) ascorbic acid 500 MG Take 500 mg by mouth every day.        Aspirin (Tab)  MG Take 325 mg by mouth every 6 hours as needed.          Cholecalciferol (Tab) cholecalciferol 1000 UNIT Take 1,000 Units by mouth every day.          Cholestyramine (Pack) QUESTRAN 4 G TAKE 1 PACKET(4 GRAMS) BY MOUTH AS DIRECTED ON PACKAGE TWO Times A Day        ClonazePAM (Tab) KLONOPIN 1 MG Take 1 Tab by mouth every day. AT BEDTIME AS NEEDED FOR SLEEP        Clotrimazole-Betamethasone (Cream) LOTRISONE 1-0.05 % Apply 1 Application to affected area(s) 2 times a day.        Cyanocobalamin (Tab) VITAMIN B-12 500 MCG Take 500 mcg by mouth every day.        DilTIAZem HCl (Tab) CARDIZEM 120 MG Take 120 mg by mouth every day.        Esomeprazole Magnesium (CAPSULE DELAYED RELEASE) NEXIUM 40 MG Take 40 mg by mouth every morning before breakfast.        Estradiol (Tab) ESTRACE 2 MG Take 1 Tab by mouth every day.        Estrogens Conjugated (Tab) PREMARIN 0.625 MG Take 0.625 mg by mouth every day. DAYS 1-25         Hydroxyurea (Cap) HYDREA 500 MG TAKE ONE CAPSULE BY MOUTH TWICE DAILY        Lisinopril (Tab) PRINIVIL 10 MG Take 10 mg by mouth every day.        Metoprolol Tartrate (Tab) LOPRESSOR 25 MG Take 25 mg by mouth 2 times a day.        Multiple Vitamins-Minerals (Tab) THERAGRAN-M  Take 1 Tab by mouth every day.        Oxybutynin Chloride (TABLET SR 24 HR) DITROPAN-XL 10 MG Take 1 Tab by mouth every day.        Oxybutynin Chloride (Tab) DITROPAN 5 MG TAKE TWO TABLETS BY MOUTH IN THE MORNING        Prochlorperazine Maleate (Tab) COMPAZINE 10 MG Take 1 Tab by mouth every 6 hours as needed.        Rizatriptan Benzoate (Tab) MAXALT 10 MG Take 1 Tab by mouth Once PRN for Migraine for 1 dose. May repeat in 2 hours if needed        Sertraline HCl (Tab) ZOLOFT 50 MG Take 1 Tab by mouth every day.        Torsemide (Tab) DEMADEX 20 MG Take 20 mg by mouth every day.        Vitamin A (Cap) vitamin A 94856 UNIT Take 8,000 Units by mouth every day.        Vitamin E (Cap) VITAMIN E 400 UNIT Take 400 Units by mouth every day.        .                 Medicines prescribed today were sent to:     [address], [place] - [address] [place] [address]  [place] [address]    Phone: [phone] Fax: [phone]    Open 24 Hours?: No    [id number] - [place], [place] - [address] [facility] [address]    [address] [place] [address]    Phone: [phone] Fax: [phone]    Open 24 Hours?: No      Medication refill instructions:       If your prescription bottle indicates you have medication refills left, it is not necessary to call your provider’s office. Please contact your pharmacy and they will refill your medication.    If your prescription bottle indicates you do not have any refills left, you may request refills at any time through one of the following ways: The online [facility] system (except Urgent Care), by calling your provider’s office, or by asking your pharmacy to contact your provider’s office with a refill request. Medication refills are processed only during regular business hours and may not be available until the next business day. Your provider may request additional information or to have a follow-up visit with you prior to refilling your medication.   *Please Note: Medication refills are assigned a new Rx number when refilled electronically. Your pharmacy may indicate that no refills were authorized even though a new prescription for the same medication is available at the pharmacy. Please request the medicine by name with the pharmacy before contacting your provider for a refill.        Your To Do List     Standing Orders Interval Expires    CBC WITH DIFFERENTIAL  q 3 months until 5/2/2018 5/2/2018    COMP METABOLIC PANEL  q 3 months until 5/2/2018 5/2/2018         [employer] Status: Patient Declined

## 2017-05-04 DIAGNOSIS — D47.3 ESSENTIAL THROMBOCYTHEMIA (HCC): ICD-10-CM

## 2017-05-05 RX ORDER — HYDROXYUREA 500 MG/1
CAPSULE ORAL
Qty: 180 CAP | Refills: 0 | Status: SHIPPED | OUTPATIENT
Start: 2017-05-05

## 2017-09-14 ENCOUNTER — TELEPHONE (OUTPATIENT)
Dept: HEMATOLOGY ONCOLOGY | Facility: MEDICAL CENTER | Age: 82
End: 2017-09-14

## 2017-09-14 NOTE — TELEPHONE ENCOUNTER
Tried contacting patient to give her new appointment information. I rescheduled since Dr. Casillas is on call. Patient's  informs me the patient is .